# Patient Record
Sex: FEMALE | Race: BLACK OR AFRICAN AMERICAN | URBAN - METROPOLITAN AREA
[De-identification: names, ages, dates, MRNs, and addresses within clinical notes are randomized per-mention and may not be internally consistent; named-entity substitution may affect disease eponyms.]

---

## 2019-03-05 ENCOUNTER — OFFICE VISIT (OUTPATIENT)
Dept: URBAN - METROPOLITAN AREA CLINIC 18 | Facility: CLINIC | Age: 44
End: 2019-03-05
Payer: COMMERCIAL

## 2019-03-05 DIAGNOSIS — H25.11 AGE-RELATED NUCLEAR CATARACT OF RIGHT EYE: ICD-10-CM

## 2019-03-05 PROCEDURE — 92134 CPTRZ OPH DX IMG PST SGM RTA: CPT | Performed by: OPTOMETRIST

## 2019-03-05 PROCEDURE — 99204 OFFICE O/P NEW MOD 45 MIN: CPT | Performed by: OPTOMETRIST

## 2019-03-05 ASSESSMENT — INTRAOCULAR PRESSURE
OS: 12
OD: 13

## 2019-03-05 NOTE — IMPRESSION/PLAN
Impression: Age-related nuclear cataract of right eye: H25.11. Plan: Discussed diagnosis with pt. No treatment recommended at this time. Will continue to monitor. Pt to call if VA worsens.

## 2019-03-05 NOTE — IMPRESSION/PLAN
Impression: Type 2 diabetes mellitus w/ proliferative diabetic retinopathy w/ macular edema, left eye: A83.7758. Plan: Diabetes type II: mild background diabetic retinopathy, no signs of neovascularization noted. No treatment necessary at this time. Patient was instructed to monitor vision for sudden changes and to call if visual changes noted. Discussed ocular and systemic benefits of blood sugar control.

## 2019-10-09 ENCOUNTER — OFFICE VISIT (OUTPATIENT)
Dept: URBAN - METROPOLITAN AREA CLINIC 17 | Facility: CLINIC | Age: 44
End: 2019-10-09
Payer: COMMERCIAL

## 2019-10-09 PROCEDURE — 2022F DILAT RTA XM EVC RTNOPTHY: CPT | Performed by: OPHTHALMOLOGY

## 2019-10-09 PROCEDURE — 99214 OFFICE O/P EST MOD 30 MIN: CPT | Performed by: OPHTHALMOLOGY

## 2019-10-09 PROCEDURE — 92134 CPTRZ OPH DX IMG PST SGM RTA: CPT | Performed by: OPHTHALMOLOGY

## 2019-10-09 RX ORDER — PREDNISOLONE ACETATE 10 MG/ML
1 % SUSPENSION/ DROPS OPHTHALMIC
Qty: 1 | Refills: 1 | Status: INACTIVE
Start: 2019-10-09 | End: 2020-02-13

## 2019-10-09 RX ORDER — OFLOXACIN 3 MG/ML
0.3 % SOLUTION/ DROPS OPHTHALMIC
Qty: 1 | Refills: 0 | Status: INACTIVE
Start: 2019-10-09 | End: 2020-02-13

## 2019-10-09 ASSESSMENT — INTRAOCULAR PRESSURE
OS: 6
OD: 14

## 2019-10-09 NOTE — IMPRESSION/PLAN
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 Plan: Mac-off total RD with tractional and rhegmatogenous component. D/w pt guarded visual prognosis with likely need for multiple surgical interventions. R/B/A discussed with pt including but not limited to bleeding, infection, recurrent detachment/tear, increased or decreased IOP, cataract if phakic, failure to accomplish surgical goals, need for repeat surgery, loss of vision, loss of eye, anesthetic complications (to be reviewed further with anesthesiologist on day of surgery), death. Discussed travel restrictions such as no flying or driving to higher elevation if a gas bubble is used. RL-2. Plan for PPV, MP, oil OS. (Oflox/PF QID -start day after sx).

## 2019-10-09 NOTE — IMPRESSION/PLAN
Impression: Type 2 diabetes mellitus w/ proliferative diabetic retinopathy w/o macular edema, right eye: e11.3591. Plan: Exam and OCT w/no DME or NV. Discussed diagnosis in detail w/pt. No treatment is req'd. Will cont to monitor. Discussed importance of glycemic, bp, lipid control. Monocular precautions.

## 2019-10-24 ENCOUNTER — SURGERY (OUTPATIENT)
Dept: URBAN - METROPOLITAN AREA SURGERY 15 | Facility: SURGERY | Age: 44
End: 2019-10-24
Payer: COMMERCIAL

## 2019-10-24 PROCEDURE — 67113 REPAIR RETINAL DETACH CPLX: CPT | Performed by: OPHTHALMOLOGY

## 2019-10-25 ENCOUNTER — POST-OPERATIVE VISIT (OUTPATIENT)
Dept: URBAN - METROPOLITAN AREA CLINIC 17 | Facility: CLINIC | Age: 44
End: 2019-10-25

## 2019-10-25 ASSESSMENT — INTRAOCULAR PRESSURE
OS: 17
OD: 15

## 2019-10-29 ENCOUNTER — POST-OPERATIVE VISIT (OUTPATIENT)
Dept: URBAN - METROPOLITAN AREA CLINIC 17 | Facility: CLINIC | Age: 44
End: 2019-10-29

## 2019-10-29 PROCEDURE — 99024 POSTOP FOLLOW-UP VISIT: CPT | Performed by: OPHTHALMOLOGY

## 2019-10-29 ASSESSMENT — INTRAOCULAR PRESSURE
OS: 15
OD: 17

## 2019-12-04 ENCOUNTER — POST-OPERATIVE VISIT (OUTPATIENT)
Dept: URBAN - METROPOLITAN AREA CLINIC 17 | Facility: CLINIC | Age: 44
End: 2019-12-04

## 2019-12-04 PROCEDURE — 99024 POSTOP FOLLOW-UP VISIT: CPT | Performed by: OPHTHALMOLOGY

## 2019-12-04 ASSESSMENT — INTRAOCULAR PRESSURE
OD: 14
OS: 17

## 2020-01-07 ENCOUNTER — POST-OPERATIVE VISIT (OUTPATIENT)
Dept: URBAN - METROPOLITAN AREA CLINIC 17 | Facility: CLINIC | Age: 45
End: 2020-01-07
Payer: COMMERCIAL

## 2020-01-07 PROCEDURE — 99024 POSTOP FOLLOW-UP VISIT: CPT | Performed by: OPHTHALMOLOGY

## 2020-01-07 PROCEDURE — 67028 INJECTION EYE DRUG: CPT | Performed by: OPHTHALMOLOGY

## 2020-01-07 ASSESSMENT — INTRAOCULAR PRESSURE
OS: 17
OD: 16

## 2020-02-13 ENCOUNTER — OFFICE VISIT (OUTPATIENT)
Dept: URBAN - METROPOLITAN AREA CLINIC 17 | Facility: CLINIC | Age: 45
End: 2020-02-13
Payer: COMMERCIAL

## 2020-02-13 DIAGNOSIS — E11.3512 TYPE 2 DIABETES MELLITUS W/ PROLIFERATIVE DIABETIC RETINOPATHY W/ MACULAR EDEMA, LEFT EYE: Primary | ICD-10-CM

## 2020-02-13 DIAGNOSIS — H35.372 PUCKERING OF MACULA, LEFT EYE: ICD-10-CM

## 2020-02-13 DIAGNOSIS — H25.812 COMBINED FORMS OF AGE-RELATED CATARACT, LEFT EYE: ICD-10-CM

## 2020-02-13 PROCEDURE — 92014 COMPRE OPH EXAM EST PT 1/>: CPT | Performed by: OPHTHALMOLOGY

## 2020-02-13 PROCEDURE — 92134 CPTRZ OPH DX IMG PST SGM RTA: CPT | Performed by: OPHTHALMOLOGY

## 2020-02-13 RX ORDER — PREDNISOLONE ACETATE 10 MG/ML
1 % SUSPENSION/ DROPS OPHTHALMIC
Qty: 10 | Refills: 5 | Status: INACTIVE
Start: 2020-02-13 | End: 2020-07-27

## 2020-02-13 RX ORDER — OFLOXACIN 3 MG/ML
0.3 % SOLUTION/ DROPS OPHTHALMIC
Qty: 5 | Refills: 3 | Status: INACTIVE
Start: 2020-02-13 | End: 2021-04-28

## 2020-02-13 ASSESSMENT — INTRAOCULAR PRESSURE
OD: 14
OS: 12

## 2020-02-13 NOTE — IMPRESSION/PLAN
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 Condition: stable. Vision: vision affected. Plan: Discussed diagnosis in detail with patient. Recommend additional sx for removal of S. O., scar tissue, treat the swelling and remove Cataract - see notes above.

## 2020-02-13 NOTE — IMPRESSION/PLAN
Impression: Combined forms of age-related cataract, left eye: H25.812. OS. Condition: worsening. Vision: vision affected. Plan: Discussed diagnosis in detail with patient. Recommend removal of Cataract, proceed with Limbal Lensectomy and repair of retina OS - see notes above.

## 2020-02-13 NOTE — IMPRESSION/PLAN
Impression: Type 2 diabetes mellitus w/ proliferative diabetic retinopathy w/ macular edema, left eye: B04.2172. OS. Condition: stable. Vision: vision affected. s/p PPVX for Repair of TRD OS 10/24/2019 w/Dr Evans Plan: Discussed diagnosis in detail with patient. Discussed risks of progression. Exam OS shows the retina is fully attached with mild edema, mild ERM, S. O. and Cataract OS. Discussed additional surgery for removal of S. O., removal of scar tissue and removal of Cataract OS with Limbal Lensectomy and PPVX left eye for possible vision improvement. Discussed surgery in detail with risks and benefits. All questions answered. Patient  elects to proceed with recommendation. RL1. OCT OS shows ILM / ERM with mild edema. Erx Prednisolone and Ofloxacin to patient's pharmacy.  Schedule A - Scan prior to surgery (Oil setting)

## 2020-02-13 NOTE — IMPRESSION/PLAN
Impression: Puckering of macula, left eye: H35.372. OS. Condition: unstable. Vision: vision affected. Plan: Discussed diagnosis in detail with patient. Recommend removal of ERM - see notes above.

## 2020-02-13 NOTE — IMPRESSION/PLAN
Impression: Type 2 diabetes mellitus w/ proliferative diabetic retinopathy w/o macular edema, right eye: e11.3591. Condition: stable. Vision: vision not affected. Plan: Discussed diagnosis in detail with patient. Exam shows minimal Diabetic changes. No treatment is recommended at this time. Emphasized blood sugar control and advised to keep future appointments with PCP and/or Endocrinologist for the management of Diabetes. Recommend observation for now. OCT OD is stable.

## 2020-03-11 ENCOUNTER — PRE-OPERATIVE VISIT (OUTPATIENT)
Dept: URBAN - METROPOLITAN AREA CLINIC 17 | Facility: CLINIC | Age: 45
End: 2020-03-11
Payer: COMMERCIAL

## 2020-03-11 ASSESSMENT — PACHYMETRY
OD: 2.97
OS: 23.63
OD: 23.48
OS: 3.04

## 2020-07-20 ENCOUNTER — SURGERY (OUTPATIENT)
Dept: URBAN - METROPOLITAN AREA SURGERY 7 | Facility: SURGERY | Age: 45
End: 2020-07-20
Payer: COMMERCIAL

## 2020-07-20 PROCEDURE — 67041 VIT FOR MACULAR PUCKER: CPT | Performed by: OPHTHALMOLOGY

## 2020-07-21 ENCOUNTER — POST-OPERATIVE VISIT (OUTPATIENT)
Dept: URBAN - METROPOLITAN AREA CLINIC 17 | Facility: CLINIC | Age: 45
End: 2020-07-21

## 2020-07-21 PROCEDURE — 99024 POSTOP FOLLOW-UP VISIT: CPT | Performed by: OPTOMETRIST

## 2020-07-21 ASSESSMENT — INTRAOCULAR PRESSURE
OD: 16
OS: 2

## 2020-07-27 ENCOUNTER — POST-OPERATIVE VISIT (OUTPATIENT)
Dept: URBAN - METROPOLITAN AREA CLINIC 17 | Facility: CLINIC | Age: 45
End: 2020-07-27

## 2020-07-27 DIAGNOSIS — Z09 ENCNTR FOR F/U EXAM AFT TRTMT FOR COND OTH THAN MALIG NEOPLM: Primary | ICD-10-CM

## 2020-07-27 PROCEDURE — 99024 POSTOP FOLLOW-UP VISIT: CPT | Performed by: OPTOMETRIST

## 2020-07-27 RX ORDER — PREDNISOLONE ACETATE 10 MG/ML
1 % SUSPENSION/ DROPS OPHTHALMIC
Qty: 10 | Refills: 5 | Status: INACTIVE
Start: 2020-07-27 | End: 2021-04-28

## 2020-07-27 ASSESSMENT — INTRAOCULAR PRESSURE
OS: 15
OD: 12

## 2020-09-02 ENCOUNTER — POST-OPERATIVE VISIT (OUTPATIENT)
Dept: URBAN - METROPOLITAN AREA CLINIC 17 | Facility: CLINIC | Age: 45
End: 2020-09-02
Payer: COMMERCIAL

## 2020-09-02 DIAGNOSIS — H26.492 OTHER SECONDARY CATARACT, LEFT EYE: ICD-10-CM

## 2020-09-02 PROCEDURE — 99024 POSTOP FOLLOW-UP VISIT: CPT | Performed by: OPHTHALMOLOGY

## 2020-09-02 PROCEDURE — 76512 OPH US DX B-SCAN: CPT | Performed by: OPHTHALMOLOGY

## 2020-09-02 ASSESSMENT — INTRAOCULAR PRESSURE
OS: 12
OD: 14

## 2020-09-02 NOTE — IMPRESSION/PLAN
Impression: Other secondary cataract, left eye: H26.492. Left. Vision: vision affected. Plan: Discussed diagnosis in detail with patient. Recommend Yag Laser treatment LEFT EYE to allow a better view of the retina and for possible vision improvement. Discussed risks/benefits of laser TX. All questions answered. Patient elects to proceed with recommendation. RL1.

## 2020-09-02 NOTE — IMPRESSION/PLAN
Impression: S/P 25 GA [place] ERMX EL RYAN OIL REMOVAL Lensectomy with C3f8 OS - 44 Days. Encounter for surgical aftercare following surgery on a sense organ  Z48.810. Opacified Capsule OS. Plan: Exam OS shows the IOL is in position with PCO, unable to assess the retina, poor view of the fundus. Will proceed with B- Scan OS. B - Scan OS shows the retina is in good position. Recommend a Yag laser LEFT EYE to allow a better view of the retina in the future and for possible vision improvement. Discussed the risks and benefits of laser tx. RL1. All questions answered. Patient elects to proceed with recommendation. OCT OS unable to obtain, Optos OS shows the gas is decreasing w/hazy view.

## 2020-11-03 ENCOUNTER — SURGERY (OUTPATIENT)
Dept: URBAN - METROPOLITAN AREA SURGERY 7 | Facility: SURGERY | Age: 45
End: 2020-11-03
Payer: COMMERCIAL

## 2020-11-03 PROCEDURE — 66821 AFTER CATARACT LASER SURGERY: CPT | Performed by: OPHTHALMOLOGY

## 2020-11-11 ENCOUNTER — POST-OPERATIVE VISIT (OUTPATIENT)
Dept: URBAN - METROPOLITAN AREA CLINIC 18 | Facility: CLINIC | Age: 45
End: 2020-11-11
Payer: COMMERCIAL

## 2020-11-11 DIAGNOSIS — Z48.810 ENCOUNTER FOR SURGICAL AFTERCARE FOLLOWING SURGERY ON A SENSE ORGAN: Primary | ICD-10-CM

## 2020-11-11 PROCEDURE — 99024 POSTOP FOLLOW-UP VISIT: CPT | Performed by: OPTOMETRIST

## 2020-11-11 ASSESSMENT — INTRAOCULAR PRESSURE
OS: 11
OD: 14

## 2020-11-11 NOTE — IMPRESSION/PLAN
Impression: S/P Yag OS - 8 Days. Encounter for surgical aftercare following surgery on a sense organ  Z48.810.  Plan: retinal eval 2-3 weeks for vit heme OS

## 2020-11-25 ENCOUNTER — OFFICE VISIT (OUTPATIENT)
Dept: URBAN - METROPOLITAN AREA CLINIC 17 | Facility: CLINIC | Age: 45
End: 2020-11-25
Payer: COMMERCIAL

## 2020-11-25 PROCEDURE — 92134 CPTRZ OPH DX IMG PST SGM RTA: CPT | Performed by: OPHTHALMOLOGY

## 2020-11-25 PROCEDURE — 92014 COMPRE OPH EXAM EST PT 1/>: CPT | Performed by: OPHTHALMOLOGY

## 2020-11-25 RX ORDER — PREDNISOLONE ACETATE 10 MG/ML
1 % SUSPENSION/ DROPS OPHTHALMIC
Qty: 10 | Refills: 10 | Status: ACTIVE
Start: 2020-11-25

## 2020-11-25 RX ORDER — ATROPINE SULFATE 10 MG/ML
1 % SOLUTION/ DROPS OPHTHALMIC
Qty: 5 | Refills: 10 | Status: ACTIVE
Start: 2020-11-25

## 2020-11-25 ASSESSMENT — INTRAOCULAR PRESSURE
OS: 20
OD: 16

## 2020-11-25 NOTE — IMPRESSION/PLAN
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 Left. Condition: unstable. Vision: vision affected. s/p Yag OS 11/03/2020 w/Dr Godinez
s/p PPVX for removal of ERM, Cataract and S.O OS 07/20/2020 w/Dr Godinez AV OS 01/07/2020 w/Dr Evans
s/p PPVX for Repair of PDR w/ TRD OS 10/24/2019 w/Dr Evans Plan: Discussed diagnosis in detail with patient - see notes above.

## 2020-11-25 NOTE — IMPRESSION/PLAN
Impression: Hyphema, left eye: H21.02. Left. Condition: unstable. Vision: vision affected - poor prognosis s/p Yag OS 11/03/2020 w/Dr Godinez
s/p PPVX for removal of ERM, Cataract and S.O OS 07/20/2020 w/Dr Godinez AV OS 01/07/2020 w/Dr Evans
s/p PPVX for Repair of PDR w/ TRD OS 10/24/2019 w/Dr Evans Plan: Discussed diagnosis in detail with patient. Exam OS shows the eye is inflamed, with no view of the retina. Recommend eye drops to reduce pain and inflammation. Start Atropine OS BID and Prednisolone OS QID. Recommend a B - Scan OS at ST to assess the retina Explained to patient that the eye has had multiple extensive surgeries, the eye is in bad shape, worry about the eye - poor prognosis. OCT OS unable to obtain and Optos OS shows a hazy view of the retina.

## 2020-11-25 NOTE — IMPRESSION/PLAN
Impression: Type 2 diabetes mellitus w/ proliferative diabetic retinopathy w/ macular edema, left eye: K29.5478. OS. Condition: unstable. Vision: vision affected. s/p Yag OS 11/03/2020 w/Dr Godinez
s/p PPVX for removal of ERM, Cataract and S.O OS 07/20/2020 w/Dr Godinez AV OS 01/07/2020 w/Dr Evans
s/p PPVX for Repair of PDR w/ TRD OS 10/24/2019 w/Dr Evans Plan: Discussed diagnosis in detail with patient. Exam OS shows the eye is inflamed, with no view of the retina. Recommend eye drops to reduce pain and inflammation. Start Atropine OS BID and Prednisolone OS QID. Recommend a B - Scan OS at ST to assess the retina Explained to patient that the eye has had multiple extensive surgeries, worry about the eye - poor prognosis. OCT OS unable to obtain and Optos OS shows a hazy view of the retina.

## 2020-12-01 ENCOUNTER — TESTING ONLY (OUTPATIENT)
Dept: URBAN - METROPOLITAN AREA CLINIC 23 | Facility: CLINIC | Age: 45
End: 2020-12-01
Payer: COMMERCIAL

## 2020-12-01 PROCEDURE — 92134 CPTRZ OPH DX IMG PST SGM RTA: CPT | Performed by: OPHTHALMOLOGY

## 2020-12-09 ENCOUNTER — OFFICE VISIT (OUTPATIENT)
Dept: URBAN - METROPOLITAN AREA CLINIC 23 | Facility: CLINIC | Age: 45
End: 2020-12-09
Payer: COMMERCIAL

## 2020-12-09 PROCEDURE — 99213 OFFICE O/P EST LOW 20 MIN: CPT | Performed by: OPHTHALMOLOGY

## 2020-12-09 PROCEDURE — 76512 OPH US DX B-SCAN: CPT | Performed by: OPHTHALMOLOGY

## 2020-12-09 ASSESSMENT — INTRAOCULAR PRESSURE
OS: 12
OD: 12

## 2020-12-09 NOTE — IMPRESSION/PLAN
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 Left. Condition: unstable- poor prognosis. Vision: vision affected. s/p Yag OS 11/03/2020 w/Dr Godinez
s/p PPVX for removal of ERM, Cataract and S.O OS 07/20/2020 w/Dr Godinez AV OS 01/07/2020 w/Dr Evans
s/p PPVX for Repair of PDR w/ TRD OS 10/24/2019 w/Dr Evans Plan: Advised patient of condition. Discussed diagnosis in detail with patient.  See notes above- Poor Prognosis

## 2020-12-09 NOTE — IMPRESSION/PLAN
Impression: Hyphema, left eye: H21.02. Left. Condition: unstable. Vision: vision affected - poor prognosis s/p Yag OS 11/03/2020 w/Dr Godinez
s/p PPVX for removal of ERM, Cataract and S.O OS 07/20/2020 w/Dr Godinez AV OS 01/07/2020 w/Dr Evans
s/p PPVX for Repair of PDR w/ TRD OS 10/24/2019 w/Dr Evans Plan: Patient returned today for a B - Scan to further assess the retina. B - Scan OS shows a retina detachment with blood. Exam OS shows the eye is inflamed, with no view of the retina. Discussed diagnosis in detail with patient. Explained to patient that the eye has had multiple extensive surgeries, and the eye is in bad shape-poor prognosis. If surgical treatment was done to repair the retina there is a 98% chance it is not repairable and 2% chance of not having good vision after surgery. Would not recommend surgery to repair the left eye because of the poor prognosis it has, recommend continuing  the eye drops to reduce pain and inflammation. Continue Atropine OS BID and Prednisolone OS QID. Dustin Catracho Patient agrees with treatment plan. Recommend a follow up in 6-8 weeks to follow up and monitor the right eye. Patient would like to know if she qualifies for disability because of the limited vision in the left eye. Discussed with patient her left eye is blind with a vision of NLP, she would need to contact the disability office about receiving disability and we will provide any documentation needed for her.

## 2021-01-28 ENCOUNTER — OFFICE VISIT (OUTPATIENT)
Dept: URBAN - METROPOLITAN AREA CLINIC 23 | Facility: CLINIC | Age: 46
End: 2021-01-28
Payer: COMMERCIAL

## 2021-01-28 DIAGNOSIS — E11.3591 TYPE 2 DIABETES MELLITUS W/ PROLIFERATIVE DIABETIC RETINOPATHY W/O MACULAR EDEMA, RIGHT EYE: ICD-10-CM

## 2021-01-28 DIAGNOSIS — E11.3522 TYPE 2 DIABETES MELLITUS WITH PROLIFERATIVE DIABETIC RETINOPATHY WITH TRACTION RETINAL DETACHMENT INVOLVING THE MACULA, LEFT EYE: ICD-10-CM

## 2021-01-28 PROCEDURE — 99213 OFFICE O/P EST LOW 20 MIN: CPT | Performed by: OPHTHALMOLOGY

## 2021-01-28 PROCEDURE — 92134 CPTRZ OPH DX IMG PST SGM RTA: CPT | Performed by: OPHTHALMOLOGY

## 2021-01-28 ASSESSMENT — INTRAOCULAR PRESSURE
OD: 14
OS: 14

## 2021-01-28 NOTE — IMPRESSION/PLAN
Impression: Type 2 diabetes mellitus w/ proliferative diabetic retinopathy w/o macular edema, right eye: e11.3591. Condition: stable. Vision: vision not affected. Plan: Discussed diagnosis in detail with patient. Exam shows minimal Diabetic changes. OCT and Optos show the macula is stable, no active edema OD with prior laser tx. No treatment is recommended at this time. Emphasized blood sugar control and advised to keep future appointments with PCP and/or Endocrinologist for the management of Diabetes. Recommend close observation for now due to this being the patients good eye. Advised patient to call immediately with changes in vision. See notes above.

## 2021-01-28 NOTE — IMPRESSION/PLAN
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 Left. Condition: unstable- poor prognosis. Vision: vision affected. s/p Yag OS 11/03/2020 w/Dr Godinez
s/p PPVX for removal of ERM, Cataract and S.O OS 07/20/2020 w/Dr Godinez AV OS 01/07/2020 w/Dr Evans
s/p PPVX for Repair of PDR w/ TRD OS 10/24/2019 w/Dr Evans Plan: Advised patient of condition. Discussed diagnosis in detail with patient.  See notes above- Poor Prognosis

## 2021-01-28 NOTE — IMPRESSION/PLAN
Impression: Hyphema, left eye: H21.02. Left. Condition: unstable. Vision: vision affected - poor prognosis s/p Yag OS 11/03/2020 w/Dr Godinez
s/p PPVX for removal of ERM, Cataract and S.O OS 07/20/2020 w/Dr Godinez AV OS 01/07/2020 w/Dr Evans
s/p PPVX for Repair of PDR w/ TRD OS 10/24/2019 w/Dr Evans Plan: Advised patient of condition. Discussed diagnosis in detail with patient. No change to current treatment. Due to hyphema, unable to obtain OCT OS today- poor view and Optos shows a hazy view OS. Exam of OS shows hyphema has slightly decreased since last visit, thick scar tissue behind the lens, and cyclitic membrane OS. Due to poor prognosis and history of OS, advised patient that the eye is likely unable to recover, irreversible vision loss even with further surgical intervention. Advised patient to continue using gtts as prescribed, Atropine BID OS and Prednisolone QID OS until next visit. Recommend a retina follow-up in 3 months to reassess condition.

## 2021-04-28 ENCOUNTER — OFFICE VISIT (OUTPATIENT)
Dept: URBAN - METROPOLITAN AREA CLINIC 23 | Facility: CLINIC | Age: 46
End: 2021-04-28
Payer: COMMERCIAL

## 2021-04-28 DIAGNOSIS — H21.1X2: ICD-10-CM

## 2021-04-28 DIAGNOSIS — H21.02 HYPHEMA, LEFT EYE: Primary | ICD-10-CM

## 2021-04-28 PROCEDURE — 99213 OFFICE O/P EST LOW 20 MIN: CPT | Performed by: OPHTHALMOLOGY

## 2021-04-28 PROCEDURE — 92134 CPTRZ OPH DX IMG PST SGM RTA: CPT | Performed by: OPHTHALMOLOGY

## 2021-04-28 ASSESSMENT — INTRAOCULAR PRESSURE
OD: 14
OS: 15

## 2021-04-28 NOTE — IMPRESSION/PLAN
Impression: Hyphema, left eye: H21.02. Left. Condition: unstable. Vision: vision affected - poor prognosis s/p Yag OS 11/03/2020 w/Dr Godinez
s/p PPVX for removal of ERM, Cataract and S.O OS 07/20/2020 w/Dr Godinez AV OS 01/07/2020 w/Dr Evans
s/p PPVX for Repair of PDR w/ TRD OS 10/24/2019 w/Dr Evans Plan: Due to Coronavirus COVID-19 pandemic and National Emergency, deferred Slit Lamp examination. Findings are based on OCT and Optos. Unable to obtain OCT OS and Optos shows hazy view due to hyphema. Due to poor prognosis and history of OS, advised patient that the eye is likely unable to recover, irreversible vision loss even with further surgical intervention. Patient states she is having increased pain in the left eye. She is not using the Atropine currently in the left eye, only using the Prednisolone. Advised patient to continue using gtts as prescribed, restart Atropine BID OS and continue Prednisolone QID OS until next visit. Discussed with patient the Atropine drop is to help reduce the pain the left eye, if the Atropine does not help improve the pain may need to consider scheduling a consult with Dr. Topher Adams or Dr. Ramon Dash to discuss evisceration vs enucleation. Recommend a retina follow-up in 3 months to reassess condition sooner if no improvement.

## 2021-04-28 NOTE — IMPRESSION/PLAN
Impression: Type 2 diabetes mellitus with proliferative diabetic retinopathy with traction retinal detachment involving the macula, left eye: J79.3114. Left. Condition: unstable- poor prognosis. Vision: vision affected. s/p Yag OS 11/03/2020 w/Dr Godinez
s/p PPVX for removal of ERM, Cataract and S.O OS 07/20/2020 w/Dr Godinez AV OS 01/07/2020 w/Dr Evans
s/p PPVX for Repair of PDR w/ TRD OS 10/24/2019 w/Dr Evans Plan: Advised patient of condition. Discussed diagnosis in detail with patient.  See notes above- Poor Prognosis

## 2021-08-12 ENCOUNTER — OFFICE VISIT (OUTPATIENT)
Dept: URBAN - METROPOLITAN AREA CLINIC 23 | Facility: CLINIC | Age: 46
End: 2021-08-12
Payer: COMMERCIAL

## 2021-08-12 PROCEDURE — 92134 CPTRZ OPH DX IMG PST SGM RTA: CPT | Performed by: OPHTHALMOLOGY

## 2021-08-12 PROCEDURE — 99213 OFFICE O/P EST LOW 20 MIN: CPT | Performed by: OPHTHALMOLOGY

## 2021-08-12 ASSESSMENT — INTRAOCULAR PRESSURE
OD: 17
OS: 7

## 2021-08-12 NOTE — IMPRESSION/PLAN
Impression: Hyphema, left eye: H21.02. Left. Condition: unstable. Vision: vision affected - poor prognosis s/p Yag OS 11/03/2020 w/Dr Godinez
s/p PPVX for removal of ERM, Cataract and S.O OS 07/20/2020 w/Dr Godinez AV OS 01/07/2020 w/Dr Evans
s/p PPVX for Repair of PDR w/ TRD OS 10/24/2019 w/Dr Evans Plan: Due to Coronavirus COVID-19 pandemic and National Emergency, deferred Slit Lamp examination. Findings are based on OCT and Optos. Unable to obtain OCT OS and Optos shows hazy view due to hyphema. Due to poor prognosis and history of OS, advised patient that the eye is likely unable to recover, irreversible vision loss even with further surgical intervention. Patient states she is having increased pain in the left eye. Patient is using Atropine and Prednisolone, instructed to continue. Discussed alcohol injections or possible enucleation with Dr. Lucia Prather. Schedule consult with Dr. Lucia Prather to discuss options.

## 2021-09-09 ENCOUNTER — OFFICE VISIT (OUTPATIENT)
Dept: URBAN - METROPOLITAN AREA CLINIC 23 | Facility: CLINIC | Age: 46
End: 2021-09-09
Payer: COMMERCIAL

## 2021-09-09 DIAGNOSIS — H33.052 TOTAL RETINAL DETACHMENT, LEFT EYE: ICD-10-CM

## 2021-09-09 DIAGNOSIS — H54.42A5 BLINDNESS LEFT EYE CATEGORY 5, NORMAL VISION RIGHT EYE: Primary | ICD-10-CM

## 2021-09-09 PROCEDURE — 99214 OFFICE O/P EST MOD 30 MIN: CPT | Performed by: OPHTHALMOLOGY

## 2021-09-09 NOTE — IMPRESSION/PLAN
Impression: Total retinal detachment, left eye: H33.052. Left. Condition: established, stable. Plan: Discussion, surgery orders, risk level and pre-op instructions listed in plan #1.

## 2021-09-09 NOTE — IMPRESSION/PLAN
Impression: Blindness left eye category 5, normal vision right eye: H54.42A5. Left. Condition: established, stable. Symptoms: may improve with surgery. Vision: vision not affected. Plan: Discussed diagnosis in detail with patient. Discussed treatment options with patient. Surgical treatment is an option. Surgical risks and benefits were discussed, explained and understood by patient. Patient will think about surgery. Would recommend left evisceration with deep implant. Patient will decide and schedule another consult if she would like to proceed.

## 2022-01-21 ENCOUNTER — OFFICE VISIT (OUTPATIENT)
Dept: URBAN - METROPOLITAN AREA CLINIC 23 | Facility: CLINIC | Age: 47
End: 2022-01-21
Payer: COMMERCIAL

## 2022-01-21 PROCEDURE — 99213 OFFICE O/P EST LOW 20 MIN: CPT | Performed by: OPHTHALMOLOGY

## 2022-01-21 PROCEDURE — 92134 CPTRZ OPH DX IMG PST SGM RTA: CPT | Performed by: OPHTHALMOLOGY

## 2022-01-21 ASSESSMENT — INTRAOCULAR PRESSURE
OD: 16
OS: 6

## 2022-01-21 NOTE — IMPRESSION/PLAN
Impression: Type 2 diabetes mellitus w/ proliferative diabetic retinopathy w/o macular edema, right eye: e11.3591. Condition: stable. Vision: vision not affected. Plan: Discussed diagnosis in detail with patient. Exam shows minimal Diabetic changes. OCT shows stable no active edema and Optos shows PRP stable no active edema or heme. No treatment is recommended at this time. Emphasized blood sugar control and advised to keep future appointments with PCP and/or Endocrinologist for the management of Diabetes. Recommend close observation for now due to this being the patients good eye. Advised patient to call immediately with changes in vision. See notes above.

## 2022-01-21 NOTE — IMPRESSION/PLAN
Impression: Type 2 diabetes mellitus with proliferative diabetic retinopathy with traction retinal detachment involving the macula, left eye: B01.1598. Left. Condition: unstable- poor prognosis. Vision: vision affected. s/p Yag OS 11/03/2020 w/Dr Godinez
s/p PPVX for removal of ERM, Cataract and S.O OS 07/20/2020 w/Dr Godinez AV OS 01/07/2020 w/Dr Evans
s/p PPVX for Repair of PDR w/ TRD OS 10/24/2019 w/Dr Evans
 Plan: Discussed diagnosis in detail with patient. Exam shows no Diabetic changes. No treatment is recommended at this time. Emphasized blood sugar control and advised to keep future appointments with PCP and/or Endocrinologist for the management of Diabetes. Recommend observation for now. OCT unable to obtain and Optos hazy - stable. Recommend f/u in 6 months.  

No active hyphema or neovascularization shown on today's exam.

## 2022-07-08 ENCOUNTER — OFFICE VISIT (OUTPATIENT)
Dept: URBAN - METROPOLITAN AREA CLINIC 23 | Facility: CLINIC | Age: 47
End: 2022-07-08
Payer: COMMERCIAL

## 2022-07-08 DIAGNOSIS — E11.3522 TYPE 2 DIABETES MELLITUS WITH PROLIFERATIVE DIABETIC RETINOPATHY WITH TRACTION RETINAL DETACHMENT INVOLVING THE MACULA, LEFT EYE: ICD-10-CM

## 2022-07-08 DIAGNOSIS — E11.3591 TYPE 2 DIABETES MELLITUS W/ PROLIFERATIVE DIABETIC RETINOPATHY W/O MACULAR EDEMA, RIGHT EYE: Primary | ICD-10-CM

## 2022-07-08 PROCEDURE — 92134 CPTRZ OPH DX IMG PST SGM RTA: CPT | Performed by: OPHTHALMOLOGY

## 2022-07-08 PROCEDURE — 99213 OFFICE O/P EST LOW 20 MIN: CPT | Performed by: OPHTHALMOLOGY

## 2022-07-08 ASSESSMENT — INTRAOCULAR PRESSURE
OS: 5
OD: 17

## 2022-07-08 NOTE — IMPRESSION/PLAN
Impression: Type 2 diabetes mellitus w/ proliferative diabetic retinopathy w/o macular edema, right eye: e11.3591. Condition: stable. Vision: vision not affected. Plan: Discussed diagnosis in detail with patient. Exam shows no active Diabetic changes OD. OCT shows stable no active edema and Optos shows PRP stable no active edema or heme. No treatment is recommended at this time. Emphasized blood sugar control and advised to keep future appointments with PCP and/or Endocrinologist for the management of Diabetes. Recommend observation. Will reassess the retina in 6 mos, sooner if there is a change or decrease in vision.

## 2022-07-08 NOTE — IMPRESSION/PLAN
Impression: Type 2 diabetes mellitus with proliferative diabetic retinopathy with traction retinal detachment involving the macula, left eye: A22.3572. Left. Condition: unstable- poor prognosis. Vision: vision affected. s/p Yag OS 11/03/2020 w/Dr Godinez
s/p PPVX for removal of ERM, Cataract and S.O OS 07/20/2020 w/Dr Godinez AV OS 01/07/2020 w/Dr Evans
s/p PPVX for Repair of PDR w/ TRD OS 10/24/2019 w/Dr Evans Plan: Discussed diagnosis in detail with patient. Exam shows no view of the retina OS. OCT OS unable to obtain and Optos OS shows a hazy view. No additional treatment is recommended at this time. Emphasized blood sugar control and advised to keep future appointments with PCP and/or Endocrinologist for the management of Diabetes. Recommend observation for now. Recommend f/u in 6 months.

## 2023-01-10 ENCOUNTER — OFFICE VISIT (OUTPATIENT)
Dept: URBAN - METROPOLITAN AREA CLINIC 23 | Facility: CLINIC | Age: 48
End: 2023-01-10
Payer: COMMERCIAL

## 2023-01-10 DIAGNOSIS — E11.3522 TYPE 2 DIABETES MELLITUS WITH PROLIFERATIVE DIABETIC RETINOPATHY WITH TRACTION RETINAL DETACHMENT INVOLVING THE MACULA, LEFT EYE: ICD-10-CM

## 2023-01-10 DIAGNOSIS — E11.3591 TYPE 2 DIABETES MELLITUS W/ PROLIFERATIVE DIABETIC RETINOPATHY W/O MACULAR EDEMA, RIGHT EYE: Primary | ICD-10-CM

## 2023-01-10 PROCEDURE — 92134 CPTRZ OPH DX IMG PST SGM RTA: CPT | Performed by: OPHTHALMOLOGY

## 2023-01-10 PROCEDURE — 99213 OFFICE O/P EST LOW 20 MIN: CPT | Performed by: OPHTHALMOLOGY

## 2023-01-10 ASSESSMENT — INTRAOCULAR PRESSURE
OD: 18
OS: 6

## 2023-01-10 NOTE — IMPRESSION/PLAN
Impression: Type 2 diabetes mellitus with proliferative diabetic retinopathy with traction retinal detachment involving the macula, left eye: R94.7632. Left. Condition: unstable- poor prognosis. Vision: vision affected. s/p Yag OS 11/03/2020 w/Dr Godinez
s/p PPVX for removal of ERM, Cataract and S.O OS 07/20/2020 w/Dr Godinez AV OS 01/07/2020 w/Dr Evans
s/p PPVX for Repair of PDR w/ TRD OS 10/24/2019 w/Dr Evans Plan: Discussed diagnosis in detail with patient. Exam shows no view of the retina OS. OCT OS unable to obtain and Optos OS shows a hazy view. No additional treatment is recommended at this time. Emphasized blood sugar control and advised to keep future appointments with PCP and/or Endocrinologist for the management of Diabetes. Recommend observation for now. Recommend f/u in 5 months.

## 2023-01-10 NOTE — IMPRESSION/PLAN
Impression: Type 2 diabetes mellitus w/ proliferative diabetic retinopathy w/o macular edema, right eye: e11.3591. Condition: stable. Vision: vision not affected. Plan: Discussed diagnosis in detail with patient. Exam shows no active Diabetic changes OD. OCT shows stable macula, no active edema and Optos shows PRP stable no active edema or heme. No treatment is recommended at this time. Emphasized blood sugar control and advised to keep future appointments with PCP and/or Endocrinologist for the management of Diabetes. Recommend observation. Will reassess the retina in 5 mos, sooner if there is a change or decrease in vision.

## 2023-06-23 ENCOUNTER — OFFICE VISIT (OUTPATIENT)
Dept: URBAN - METROPOLITAN AREA CLINIC 23 | Facility: CLINIC | Age: 48
End: 2023-06-23
Payer: COMMERCIAL

## 2023-06-23 DIAGNOSIS — E11.3522 TYPE 2 DIAB W PROLIF DIAB RTNOP W TRCTN DTCH MACULA, L EYE: ICD-10-CM

## 2023-06-23 DIAGNOSIS — E11.3591 TYPE 2 DIAB WITH PROLIF DIAB RTNOP WITHOUT MCLR EDEMA, R EYE: Primary | ICD-10-CM

## 2023-06-23 PROCEDURE — 92134 CPTRZ OPH DX IMG PST SGM RTA: CPT | Performed by: OPHTHALMOLOGY

## 2023-06-23 PROCEDURE — 99213 OFFICE O/P EST LOW 20 MIN: CPT | Performed by: OPHTHALMOLOGY

## 2023-06-23 ASSESSMENT — INTRAOCULAR PRESSURE
OD: 18
OS: 3

## 2023-06-23 NOTE — IMPRESSION/PLAN
Impression: Type 2 diab with prolif diab rtnop without mclr edema, r eye: E11.3591. Right. Condition: established, stable. Vision: vision not affected. Plan: Discussed diagnosis in detail with patient. Exam OD shows minimal edema - stable. No treatment is recommended at this time. Emphasized blood sugar control and advised to keep future appointments with PCP and/or Endocrinologist for the management of Diabetes. Recommend observation for now. OCT and Optos OD shows stable quiet appearing w/ no bleeding. Will reassess condition in 6 months - sooner if any changes.

## 2023-06-23 NOTE — IMPRESSION/PLAN
Impression: Type 2 diab w prolif diab rtnop w trctn dtch macula, l eye: P56.9535. Left. Condition: unstable - poor prognosis. Vision: vision affected. s/p YAG OS 11/03/2020 w/ Dr. Francia Harley
s/p PPVX for removal of ERM, Cataract and S.O OS 07/20/2020 w/ Dr. Francia Harley
s/p AV OS 01/07/2020 w/ Dr. Mary Toledo
s/p PPVX for Repair of PDR w/ TRD OS 10/24/2019 w/ Dr. Ben Whitley: Discussed diagnosis in detail with patient. Exam OS shows no view of the retina. No treatment is recommended at this time. Emphasized blood sugar control and advised to keep future appointments with PCP and/or Endocrinologist for the management of Diabetes. Recommend observation for now. Unable to obtain OCT OS and Optos OS shows hazy view. Will reassess in 6 months - sooner if any changes.

## 2023-12-28 ENCOUNTER — OFFICE VISIT (OUTPATIENT)
Dept: URBAN - METROPOLITAN AREA CLINIC 23 | Facility: CLINIC | Age: 48
End: 2023-12-28
Payer: COMMERCIAL

## 2023-12-28 DIAGNOSIS — E11.3591 TYPE 2 DIABETES MELLITUS WITH PROLIFERATIVE DIABETIC RETINOPATHY WITHOUT MACULAR EDEMA, RIGHT EYE: Primary | ICD-10-CM

## 2023-12-28 DIAGNOSIS — E11.3522 TYPE 2 DIAB W PROLIF DIAB RTNOP W TRCTN DTCH MACULA, L EYE: ICD-10-CM

## 2023-12-28 PROCEDURE — 92134 CPTRZ OPH DX IMG PST SGM RTA: CPT | Performed by: OPHTHALMOLOGY

## 2023-12-28 PROCEDURE — 99213 OFFICE O/P EST LOW 20 MIN: CPT | Performed by: OPHTHALMOLOGY

## 2023-12-28 ASSESSMENT — INTRAOCULAR PRESSURE
OS: 3
OD: 18

## 2024-06-25 ENCOUNTER — OFFICE VISIT (OUTPATIENT)
Dept: URBAN - METROPOLITAN AREA CLINIC 23 | Facility: CLINIC | Age: 49
End: 2024-06-25
Payer: COMMERCIAL

## 2024-06-25 DIAGNOSIS — E11.3591 TYPE 2 DIAB WITH PROLIF DIAB RTNOP WITHOUT MCLR EDEMA, R EYE: Primary | ICD-10-CM

## 2024-06-25 PROCEDURE — 99213 OFFICE O/P EST LOW 20 MIN: CPT | Performed by: OPHTHALMOLOGY

## 2024-06-25 PROCEDURE — 92134 CPTRZ OPH DX IMG PST SGM RTA: CPT | Performed by: OPHTHALMOLOGY

## 2024-06-25 ASSESSMENT — INTRAOCULAR PRESSURE
OD: 18
OS: 2

## 2024-12-24 ENCOUNTER — OFFICE VISIT (OUTPATIENT)
Dept: URBAN - METROPOLITAN AREA CLINIC 23 | Facility: CLINIC | Age: 49
End: 2024-12-24
Payer: COMMERCIAL

## 2024-12-24 DIAGNOSIS — E11.3591 TYPE 2 DIAB WITH PROLIF DIAB RTNOP WITHOUT MCLR EDEMA, R EYE: Primary | ICD-10-CM

## 2024-12-24 DIAGNOSIS — E11.3522 TYPE 2 DIAB W PROLIF DIAB RTNOP W TRCTN DTCH MACULA, L EYE: ICD-10-CM

## 2024-12-24 PROCEDURE — 99213 OFFICE O/P EST LOW 20 MIN: CPT | Performed by: OPHTHALMOLOGY

## 2024-12-24 PROCEDURE — 92134 CPTRZ OPH DX IMG PST SGM RTA: CPT | Performed by: OPHTHALMOLOGY

## 2024-12-24 ASSESSMENT — INTRAOCULAR PRESSURE
OS: 2
OD: 16

## 2025-06-18 ENCOUNTER — OFFICE VISIT (OUTPATIENT)
Dept: URBAN - METROPOLITAN AREA CLINIC 23 | Facility: CLINIC | Age: 50
End: 2025-06-18
Payer: COMMERCIAL

## 2025-06-18 DIAGNOSIS — E11.3522 TYPE 2 DIAB W PROLIF DIAB RTNOP W TRCTN DTCH MACULA, L EYE: ICD-10-CM

## 2025-06-18 DIAGNOSIS — E11.3591 TYPE 2 DIABETES MELLITUS WITH PROLIFERATIVE DIABETIC RETINOPATHY WITHOUT MACULAR EDEMA, RIGHT EYE: Primary | ICD-10-CM

## 2025-06-18 PROCEDURE — 92134 CPTRZ OPH DX IMG PST SGM RTA: CPT | Performed by: OPHTHALMOLOGY

## 2025-06-18 PROCEDURE — 92012 INTRM OPH EXAM EST PATIENT: CPT | Performed by: OPHTHALMOLOGY

## 2025-06-18 ASSESSMENT — INTRAOCULAR PRESSURE
OD: 18
OS: 3